# Patient Record
Sex: MALE | Race: BLACK OR AFRICAN AMERICAN | NOT HISPANIC OR LATINO
[De-identification: names, ages, dates, MRNs, and addresses within clinical notes are randomized per-mention and may not be internally consistent; named-entity substitution may affect disease eponyms.]

---

## 2018-03-15 ENCOUNTER — APPOINTMENT (OUTPATIENT)
Dept: PAIN MANAGEMENT | Facility: CLINIC | Age: 58
End: 2018-03-15

## 2019-03-11 ENCOUNTER — APPOINTMENT (OUTPATIENT)
Dept: INTERNAL MEDICINE | Facility: CLINIC | Age: 59
End: 2019-03-11

## 2019-04-01 ENCOUNTER — APPOINTMENT (OUTPATIENT)
Dept: INTERNAL MEDICINE | Facility: CLINIC | Age: 59
End: 2019-04-01
Payer: MEDICARE

## 2019-04-01 ENCOUNTER — NON-APPOINTMENT (OUTPATIENT)
Age: 59
End: 2019-04-01

## 2019-04-01 VITALS
DIASTOLIC BLOOD PRESSURE: 94 MMHG | HEART RATE: 62 BPM | RESPIRATION RATE: 17 BRPM | OXYGEN SATURATION: 99 % | SYSTOLIC BLOOD PRESSURE: 199 MMHG | BODY MASS INDEX: 27.09 KG/M2 | HEIGHT: 72 IN | TEMPERATURE: 98.5 F | WEIGHT: 200 LBS

## 2019-04-01 DIAGNOSIS — R30.9 PAINFUL MICTURITION, UNSPECIFIED: ICD-10-CM

## 2019-04-01 PROCEDURE — 99204 OFFICE O/P NEW MOD 45 MIN: CPT

## 2019-04-01 RX ORDER — ACETAMINOPHEN 500 MG/1
500 TABLET, COATED ORAL
Qty: 60 | Refills: 0 | Status: ACTIVE | COMMUNITY
Start: 2019-04-01 | End: 1900-01-01

## 2019-04-11 LAB
ALBUMIN SERPL ELPH-MCNC: 4.3 G/DL
ALP BLD-CCNC: 57 U/L
ALT SERPL-CCNC: 20 U/L
ANION GAP SERPL CALC-SCNC: 14 MMOL/L
APPEARANCE: CLEAR
AST SERPL-CCNC: 19 U/L
BACTERIA UR CULT: NORMAL
BACTERIA: NEGATIVE
BASOPHILS # BLD AUTO: 0.04 K/UL
BASOPHILS NFR BLD AUTO: 0.7 %
BILIRUB SERPL-MCNC: 0.2 MG/DL
BILIRUBIN URINE: NEGATIVE
BLOOD URINE: NEGATIVE
BUN SERPL-MCNC: 20 MG/DL
C TRACH RRNA SPEC QL NAA+PROBE: NOT DETECTED
CALCIUM SERPL-MCNC: 9.5 MG/DL
CHLORIDE SERPL-SCNC: 106 MMOL/L
CO2 SERPL-SCNC: 26 MMOL/L
COLOR: NORMAL
CREAT SERPL-MCNC: 1.23 MG/DL
EOSINOPHIL # BLD AUTO: 0.11 K/UL
EOSINOPHIL NFR BLD AUTO: 2 %
ESTIMATED AVERAGE GLUCOSE: 128 MG/DL
GLUCOSE QUALITATIVE U: NEGATIVE
GLUCOSE SERPL-MCNC: 117 MG/DL
HAV IGM SER QL: NONREACTIVE
HBA1C MFR BLD HPLC: 6.1 %
HBV CORE IGM SER QL: NONREACTIVE
HBV SURFACE AG SER QL: NONREACTIVE
HCT VFR BLD CALC: 47.5 %
HCV AB SER QL: NONREACTIVE
HCV S/CO RATIO: 0.09 S/CO
HGB BLD-MCNC: 14.4 G/DL
HIV1+2 AB SPEC QL IA.RAPID: NONREACTIVE
HYALINE CASTS: 2 /LPF
IMM GRANULOCYTES NFR BLD AUTO: 0 %
KETONES URINE: NEGATIVE
LEUKOCYTE ESTERASE URINE: ABNORMAL
LYMPHOCYTES # BLD AUTO: 1.26 K/UL
LYMPHOCYTES NFR BLD AUTO: 22.4 %
MAN DIFF?: NORMAL
MCHC RBC-ENTMCNC: 26.4 PG
MCHC RBC-ENTMCNC: 30.3 GM/DL
MCV RBC AUTO: 87.2 FL
MICROSCOPIC-UA: NORMAL
MONOCYTES # BLD AUTO: 0.34 K/UL
MONOCYTES NFR BLD AUTO: 6 %
N GONORRHOEA RRNA SPEC QL NAA+PROBE: NOT DETECTED
NEUTROPHILS # BLD AUTO: 3.87 K/UL
NEUTROPHILS NFR BLD AUTO: 68.9 %
NITRITE URINE: NEGATIVE
PH URINE: 6.5
PLATELET # BLD AUTO: 212 K/UL
POTASSIUM SERPL-SCNC: 4.9 MMOL/L
PROT SERPL-MCNC: 7.2 G/DL
PROTEIN URINE: NORMAL
PSA FREE FLD-MCNC: 20 %
PSA FREE SERPL-MCNC: 1.02 NG/ML
PSA SERPL-MCNC: 5.07 NG/ML
RBC # BLD: 5.45 M/UL
RBC # FLD: 14.6 %
RED BLOOD CELLS URINE: 3 /HPF
SODIUM SERPL-SCNC: 146 MMOL/L
SOURCE AMPLIFICATION: NORMAL
SPECIFIC GRAVITY URINE: 1.02
SQUAMOUS EPITHELIAL CELLS: 9 /HPF
T PALLIDUM AB SER QL IA: NEGATIVE
TSH SERPL-ACNC: 1.69 UIU/ML
UROBILINOGEN URINE: NORMAL
WBC # FLD AUTO: 5.62 K/UL
WHITE BLOOD CELLS URINE: 3 /HPF

## 2019-04-17 ENCOUNTER — APPOINTMENT (OUTPATIENT)
Dept: ORTHOPEDIC SURGERY | Facility: CLINIC | Age: 59
End: 2019-04-17
Payer: MEDICARE

## 2019-04-17 DIAGNOSIS — G89.29 PAIN IN RIGHT KNEE: ICD-10-CM

## 2019-04-17 DIAGNOSIS — M25.561 PAIN IN RIGHT KNEE: ICD-10-CM

## 2019-04-17 PROCEDURE — 99203 OFFICE O/P NEW LOW 30 MIN: CPT

## 2019-04-17 PROCEDURE — 73562 X-RAY EXAM OF KNEE 3: CPT | Mod: RT

## 2019-04-17 RX ORDER — MELOXICAM 15 MG/1
15 TABLET ORAL
Qty: 30 | Refills: 1 | Status: ACTIVE | COMMUNITY
Start: 2019-04-17 | End: 1900-01-01

## 2019-04-18 LAB
BASOPHILS # BLD AUTO: 0.04 K/UL
BASOPHILS NFR BLD AUTO: 0.7 %
CRP SERPL-MCNC: 0.18 MG/DL
EOSINOPHIL # BLD AUTO: 0.15 K/UL
EOSINOPHIL NFR BLD AUTO: 2.5 %
ERYTHROCYTE [SEDIMENTATION RATE] IN BLOOD BY WESTERGREN METHOD: 16 MM/HR
HCT VFR BLD CALC: 47.9 %
HGB BLD-MCNC: 15 G/DL
IMM GRANULOCYTES NFR BLD AUTO: 0.3 %
LYMPHOCYTES # BLD AUTO: 1.55 K/UL
LYMPHOCYTES NFR BLD AUTO: 25.9 %
MAN DIFF?: NORMAL
MCHC RBC-ENTMCNC: 26.5 PG
MCHC RBC-ENTMCNC: 31.3 GM/DL
MCV RBC AUTO: 84.6 FL
MONOCYTES # BLD AUTO: 0.31 K/UL
MONOCYTES NFR BLD AUTO: 5.2 %
NEUTROPHILS # BLD AUTO: 3.92 K/UL
NEUTROPHILS NFR BLD AUTO: 65.4 %
PLATELET # BLD AUTO: 235 K/UL
RBC # BLD: 5.66 M/UL
RBC # FLD: 14.3 %
WBC # FLD AUTO: 5.99 K/UL

## 2019-04-18 NOTE — ASSESSMENT
[FreeTextEntry1] : \par 59-year-old male painful total knee replacement. Sending screening laboratory markers today. Will refer to Dr. kern arthroplasty. All questions answered

## 2019-04-18 NOTE — HISTORY OF PRESENT ILLNESS
[de-identified] : 58yo male, former [employer], presents complaining of right knee pain for one to 2 years. He is status post right knee total knee arthroplasty approximately 3-4 years ago by  at the [facility] in [place]. He is also status post left knee replacement approximately 5 years ago at the same hospital. His pain in his right knee. This is worsened with walking, standing. He feels that his right knee swells intermittently.  He attempts to ice his knee, taking over-the-counter anti-inflammatory medicine. He states he was down in [place] about 1 year ago when someone fell onto his knee.  Denies numbness tingling \par \par The patient's past medical history, past surgical history, medications, allergies, and social history were reviewed by me today with the patient and documented accordingly. In addition, the patient's family history, which is noncontributory to this visit, was also reviewed.\par

## 2019-04-18 NOTE — PHYSICAL EXAM
[de-identified] : General Exam\par \par Well developed, well nourished\par No apparent distress\par Oriented to person, place, and time\par Mood: Normal\par Affect: Normal\par Balance and coordination: Normal\par Gait: Normal\par \par right knee exam\par \par Skin: Clean, dry, intact\par Inspection: No obvious malalignment, no masses, + swelling, + effusion.\par Tenderness: no MJLT. No LJLT. No tenderness over the medial and lateral patella facets. No ttp medial/lateral epicondyle, patella tendon, tibial tubercle, pes anserinus, or proximal fibula.\par ROM: 0 to 100° no pain with deep flexion in both knees\par Stability: Stable to varus, valgus, a/p stress\par Strength: 5/5 Q/H/TA/GS/EHL, no atrophy\par Neuro: In tact to light touch throughout in dp/sp/tib/luis eduardo/saph nerve districutions, DTR's normal\par Pulses: 2+ DP/PT pulses.\par  [de-identified] : \par The following radiographs were ordered and read by me during this patients visit. I reviewed each radiograph in detail with the patient and discussed the findings as highlighted below. \par \par 3 views right knee were obtained today. Well-positioned knee replacement no evidence of loosening the fracture\par

## 2019-04-29 ENCOUNTER — APPOINTMENT (OUTPATIENT)
Dept: ORTHOPEDIC SURGERY | Facility: CLINIC | Age: 59
End: 2019-04-29
Payer: MEDICARE

## 2019-04-29 DIAGNOSIS — Z96.651 PAIN DUE TO INTERNAL ORTHOPEDIC PROSTHETIC DEVICES, IMPLANTS AND GRAFTS, INITIAL ENCOUNTER: ICD-10-CM

## 2019-04-29 DIAGNOSIS — M54.16 RADICULOPATHY, LUMBAR REGION: ICD-10-CM

## 2019-04-29 DIAGNOSIS — T84.84XA PAIN DUE TO INTERNAL ORTHOPEDIC PROSTHETIC DEVICES, IMPLANTS AND GRAFTS, INITIAL ENCOUNTER: ICD-10-CM

## 2019-04-29 DIAGNOSIS — Z96.651 PRESENCE OF RIGHT ARTIFICIAL KNEE JOINT: ICD-10-CM

## 2019-04-29 PROCEDURE — 99214 OFFICE O/P EST MOD 30 MIN: CPT

## 2019-04-29 NOTE — HISTORY OF PRESENT ILLNESS
[de-identified] : This is a 59-year-old gentleman experiencing right leg pain, which is severe in intensity. He underwent a right total knee arthroplasty 3 years ago on the  in [place]. He healed well after the surgery. He started having pain approximately 3 months ago throughout the entire right leg with stiffness in the leg and heaviness. The pain is intermittent. His knee is not giving way. He thought that this was secondary to his knee replacement so he was referred to me. The pain in the leg substantially limits activities of daily living. Walking tolerance is reduced. He is not currently taking medications for this. He complains of stiffness. He also has numbness and tingling in the right leg. He does not use a cane or walker. CRP 1.8 per liter and ESR is 16.

## 2019-04-29 NOTE — DISCUSSION/SUMMARY
[de-identified] : The right total knee arthroplasty appears to be functioning well but his PTT secondary to right lower extremity radiculopathy. Her to followup with physiatry. Over-the-counter NSAIDs are encouraged. Followup is recommended on an annual basis for followup of his total knee arthroplasty.

## 2019-04-29 NOTE — PHYSICAL EXAM
[de-identified] : Patient is well nourished, well-developed, in no acute distress, with appropriate mood and affect. The patient is oriented to time, place, and person. Respirations are even and unlabored. Gait evaluation does not reveal a limp. There is no inguinal adenopathy. Examination of the contralateral knee shows normal range of motion, strength, no tenderness, and intact skin. The affected limb is well-perfused and showed 2+ dp/pt pulses, with a well-healed midline surgical scar, shows a grossly normal motor and sensory examination. Knee motion is painless and the knee moves from 0-125 degrees. The knee is stable within that range-of-motion to AP and ML stress and no instability to varus or valgus stress. The alignment of the knee is neutral. No effusion or crepitus is noted. No tenderness to palpation about the medial or lateral joint line, medial or lateral tibial plateau, medial or lateral femoral condyle, medial or lateral patellar facets, superior or inferior pole of the patella. Muscle strength is normal. Pedal pulses are palpable. Hip examination was negative.\par  [de-identified] : AP and lateral knee x-rays of the right knee were reviewed. Satisfactory position and alignment of the components are present. No signs of loosening are seen.\par

## 2019-05-01 ENCOUNTER — APPOINTMENT (OUTPATIENT)
Dept: INTERNAL MEDICINE | Facility: CLINIC | Age: 59
End: 2019-05-01

## 2019-05-15 ENCOUNTER — APPOINTMENT (OUTPATIENT)
Dept: INTERNAL MEDICINE | Facility: CLINIC | Age: 59
End: 2019-05-15
Payer: MEDICARE

## 2019-05-15 VITALS
BODY MASS INDEX: 27.09 KG/M2 | HEART RATE: 63 BPM | RESPIRATION RATE: 17 BRPM | TEMPERATURE: 97.6 F | SYSTOLIC BLOOD PRESSURE: 164 MMHG | OXYGEN SATURATION: 97 % | HEIGHT: 72 IN | WEIGHT: 200 LBS | DIASTOLIC BLOOD PRESSURE: 91 MMHG

## 2019-05-15 DIAGNOSIS — H57.89 OTHER SPECIFIED DISORDERS OF EYE AND ADNEXA: ICD-10-CM

## 2019-05-15 DIAGNOSIS — R94.31 ABNORMAL ELECTROCARDIOGRAM [ECG] [EKG]: ICD-10-CM

## 2019-05-15 PROCEDURE — 99215 OFFICE O/P EST HI 40 MIN: CPT

## 2019-05-15 RX ORDER — MELOXICAM 7.5 MG/1
7.5 TABLET ORAL
Qty: 30 | Refills: 3 | Status: ACTIVE | COMMUNITY
Start: 2019-05-15 | End: 1900-01-01

## 2019-05-15 NOTE — PHYSICAL EXAM
[No Acute Distress] : no acute distress [Well Nourished] : well nourished [Well Developed] : well developed [Well-Appearing] : well-appearing [Normal Sclera/Conjunctiva] : normal sclera/conjunctiva [PERRL] : pupils equal round and reactive to light [EOMI] : extraocular movements intact [Normal Outer Ear/Nose] : the outer ears and nose were normal in appearance [Normal Oropharynx] : the oropharynx was normal [Supple] : supple [No Lymphadenopathy] : no lymphadenopathy [Thyroid Normal, No Nodules] : the thyroid was normal and there were no nodules present [No Respiratory Distress] : no respiratory distress  [Clear to Auscultation] : lungs were clear to auscultation bilaterally [No Accessory Muscle Use] : no accessory muscle use [Normal Rate] : normal rate  [Regular Rhythm] : with a regular rhythm [Normal S1, S2] : normal S1 and S2 [No Murmur] : no murmur heard [No Varicosities] : no varicosities [Pedal Pulses Present] : the pedal pulses are present [No Edema] : there was no peripheral edema [No Extremity Clubbing/Cyanosis] : no extremity clubbing/cyanosis [Soft] : abdomen soft [Non Tender] : non-tender [Non-distended] : non-distended [No Masses] : no abdominal mass palpated [No HSM] : no HSM [Normal Bowel Sounds] : normal bowel sounds [Normal Posterior Cervical Nodes] : no posterior cervical lymphadenopathy [Normal Anterior Cervical Nodes] : no anterior cervical lymphadenopathy [No CVA Tenderness] : no CVA  tenderness [No Joint Swelling] : no joint swelling [No Spinal Tenderness] : no spinal tenderness [No Rash] : no rash [Grossly Normal Strength/Tone] : grossly normal strength/tone [Normal Gait] : normal gait [Coordination Grossly Intact] : coordination grossly intact [No Focal Deficits] : no focal deficits [Deep Tendon Reflexes (DTR)] : deep tendon reflexes were 2+ and symmetric [Normal Affect] : the affect was normal [Normal Insight/Judgement] : insight and judgment were intact [] : no respiratory distress [Auscultation Breath Sounds / Voice Sounds] : lungs were clear to auscultation bilaterally [Heart Rate And Rhythm] : heart rate was normal and rhythm regular [Heart Sounds] : normal S1 and S2 [Heart Sounds Gallop] : no gallops [Murmurs] : no murmurs [Heart Sounds Pericardial Friction Rub] : no pericardial rub

## 2019-05-16 LAB
PSA FREE FLD-MCNC: 20 %
PSA FREE SERPL-MCNC: 1.03 NG/ML
PSA SERPL-MCNC: 5.08 NG/ML

## 2019-05-16 NOTE — HISTORY OF PRESENT ILLNESS
[de-identified] : 58 yo male with history of HTN, prediabetes, elevated PSA,  DJD/knee pain/OA s/p knee repalcement presents for f/u\par \par For prior c/o chronic b/l knee pain. He was seen by orthopedics where x-rays were done which showed alignment status post knee replacement. He will be referred back to orthopedics for further followup. In the meantime we will use NSAIDs to help with his symptoms\par \par Last visit patient with hypertension for which lisinopril was prescribed however the patient has been running out of his medication. We discussed adherence and addition of amlodipine to help control his blood pressure. Side effects reviewed and the patient will  his prescription today and return to the office for followup blood pressure check in one week. He had an abnormal EKG with [facility] and also needs to schedule followup with cardiology. A referral was given\par abnormal EKG- [facility]\par \par elevated PSA- His recent labs revealed an elevated PSA for which she was seen by q. rheumatology with recommendations to repeat his PSA one month later. He has not yet scheduled an appointment and would like to repeat his PSA in the office today. If it remains elevated I have asked the patient to go to urology to discuss need for a biopsy. She does also complaining of erectile dysfunction which he should further discuss with his urologist after cardiology assessment was completed\par \par He appears to have chronic dry eyes and irritation with bilateral conjunctival erythema and I've referred the patient to an eye doctor\par eye doctor\par \par ROS: b/l knee pain; painful urination, no lesion on penis\par Significant PMH: HTN\par Surgical Hx: cervical spine fusion, b/l knee replacement\par Allergies: NKDA\par Meds: lisinopril 20\par \par Brief Social History: work- labor\par Living situation:  Lives with girlfriend; has 2 sons\par Tobacco Use: Never smoked.\par EtOH/Drug use: 1-2 cans of beer; cut back discussed\par \par Immunization:\par Flu: declined\par \par Screening:\par Colon CA Screening: last done 2011; referral given\par

## 2019-05-16 NOTE — ASSESSMENT
[FreeTextEntry1] : 60 yo male with history of HTN, prediabetes, elevated PSA,  DJD/knee pain/OA s/p knee repalcement presents for f/u\par \par 1. HTN\par hypertension \par - add amlodipine to  lisinopril \par -Adherence with stress\par - BP side effects reviewed. \par -Goal BP <130/80; Patient should continue her blood pressure monitoring in the outpatient setting with a blood pressure monitor\par -Maintain ideal BMI, low sodium diet\par -Return to the office in 3-4 months for followup or sooner if BP running high\par \par 2. chronic b/l knee pain\par - seen by orthopedics will f/u\par - NSAIDs to help with his symptoms\par \par 3. abnormal EKG- [facility]\par -refer to cards\par \par 4. elevated PSA with c/o ED\par -refer back to urology\par -repeat 1 months PSA\par \par 5. chronic dry eyes \par - eye doctor\par \par Screening:\par Colon CA Screening: last done 2011; referral given\par \par RTC 3-4 mos

## 2019-06-01 ENCOUNTER — APPOINTMENT (OUTPATIENT)
Dept: INTERNAL MEDICINE | Facility: CLINIC | Age: 59
End: 2019-06-01
Payer: MEDICARE

## 2019-06-01 VITALS
HEART RATE: 68 BPM | HEIGHT: 72 IN | SYSTOLIC BLOOD PRESSURE: 127 MMHG | RESPIRATION RATE: 17 BRPM | WEIGHT: 200 LBS | DIASTOLIC BLOOD PRESSURE: 78 MMHG | BODY MASS INDEX: 27.09 KG/M2 | OXYGEN SATURATION: 98 %

## 2019-06-01 DIAGNOSIS — M25.562 PAIN IN RIGHT KNEE: ICD-10-CM

## 2019-06-01 DIAGNOSIS — M25.561 PAIN IN RIGHT KNEE: ICD-10-CM

## 2019-06-01 DIAGNOSIS — I10 ESSENTIAL (PRIMARY) HYPERTENSION: ICD-10-CM

## 2019-06-01 DIAGNOSIS — R97.20 ELEVATED PROSTATE, SPECIFIC ANTIGEN [PSA]: ICD-10-CM

## 2019-06-01 DIAGNOSIS — G89.29 PAIN IN RIGHT KNEE: ICD-10-CM

## 2019-06-01 PROCEDURE — 99214 OFFICE O/P EST MOD 30 MIN: CPT

## 2019-06-01 NOTE — PHYSICAL EXAM
[No Acute Distress] : no acute distress [Well Nourished] : well nourished [Well Developed] : well developed [Well-Appearing] : well-appearing [Normal Sclera/Conjunctiva] : normal sclera/conjunctiva [PERRL] : pupils equal round and reactive to light [EOMI] : extraocular movements intact [Normal Oropharynx] : the oropharynx was normal [Normal Outer Ear/Nose] : the outer ears and nose were normal in appearance [Supple] : supple [No Lymphadenopathy] : no lymphadenopathy [Thyroid Normal, No Nodules] : the thyroid was normal and there were no nodules present [No Respiratory Distress] : no respiratory distress  [Clear to Auscultation] : lungs were clear to auscultation bilaterally [No Accessory Muscle Use] : no accessory muscle use [Regular Rhythm] : with a regular rhythm [Normal Rate] : normal rate  [Normal S1, S2] : normal S1 and S2 [No Murmur] : no murmur heard [No Varicosities] : no varicosities [Pedal Pulses Present] : the pedal pulses are present [No Edema] : there was no peripheral edema [No Extremity Clubbing/Cyanosis] : no extremity clubbing/cyanosis [Soft] : abdomen soft [Non-distended] : non-distended [Non Tender] : non-tender [No Masses] : no abdominal mass palpated [No HSM] : no HSM [Normal Bowel Sounds] : normal bowel sounds [Normal Posterior Cervical Nodes] : no posterior cervical lymphadenopathy [Normal Anterior Cervical Nodes] : no anterior cervical lymphadenopathy [No CVA Tenderness] : no CVA  tenderness [No Spinal Tenderness] : no spinal tenderness [No Joint Swelling] : no joint swelling [Grossly Normal Strength/Tone] : grossly normal strength/tone [No Rash] : no rash [Normal Gait] : normal gait [Coordination Grossly Intact] : coordination grossly intact [Deep Tendon Reflexes (DTR)] : deep tendon reflexes were 2+ and symmetric [No Focal Deficits] : no focal deficits [Normal Affect] : the affect was normal [Normal Insight/Judgement] : insight and judgment were intact

## 2019-06-01 NOTE — HISTORY OF PRESENT ILLNESS
[de-identified] : 60 yo male with history of HTN, prediabetes, elevated PSA,  DJD/knee pain/OA s/p knee replacement presents for f/u\par \par For prior c/o chronic b/l knee pain. He was seen by orthopedics where x-rays were done which showed alignment status post knee replacement. He will be referred back to orthopedics for further followup. In the meantime we will use NSAIDs to help with his symptoms\par \par Last visit patient with hypertension for which lisinopril and amlodipine was given; good adherence and f/u BP at goal.\par He had an abnormal EKG with [facility] and also needs to schedule followup with cardiology. A referral was given\par abnormal EKG- [facility]\par \par elevated PSA- His recent labs revealed an elevated PSA for which she was seen by urology; repeat also > 5, pt had a prostate biopsy done yesterday, results pending and will f/u urology 2 weeks\par \par He appears to have chronic dry eyes and irritation with bilateral conjunctival erythema and I've referred the patient to an eye doctor\par eye doctor\par \par ROS: b/l knee pain; painful urination, no lesion on penis\par Significant PMH: HTN\par Surgical Hx: cervical spine fusion, b/l knee replacement\par Allergies: NKDA\par Meds: lisinopril 20\par \par Brief Social History: work- labor\par Living situation:  Lives with girlfriend; has 2 sons\par Tobacco Use: Never smoked.\par EtOH/Drug use: 1-2 cans of beer; cut back discussed\par \par Screening:\par Colon CA Screening: last done 2011; referral given\par

## 2019-06-01 NOTE — ASSESSMENT
[FreeTextEntry1] : 58 yo male with history of HTN, prediabetes, elevated PSA,  DJD/knee pain/OA s/p knee repalcement presents for f/u\par \par 1. HTN\par hypertension \par - better on amlodipine to  lisinopril \par -Adherence with stress\par -Goal BP <130/80; Patient should continue her blood pressure monitoring in the outpatient setting with a blood pressure monitor\par -Maintain ideal BMI, low sodium diet\par -Return to the office in 3-4 months for followup or sooner if BP running high\par \par 2. chronic b/l knee pain\par - seen by orthopedics will f/u\par - NSAIDs to help with his symptoms\par \par 3. abnormal EKG- [facility]\par -refer to cards\par \par 4. elevated PSA with c/o ED\par -s/p biopsy\par -f/u urology\par \par 5. chronic dry eyes \par - eye doctor\par \par Screening:\par Colon CA Screening: last done 2011; referral given\par \par RTC 3-4 mos

## 2019-06-20 ENCOUNTER — APPOINTMENT (OUTPATIENT)
Dept: CARDIOLOGY | Facility: CLINIC | Age: 59
End: 2019-06-20

## 2019-07-11 RX ORDER — MELOXICAM 15 MG/1
15 TABLET ORAL
Qty: 30 | Refills: 1 | Status: ACTIVE | COMMUNITY
Start: 2019-07-11 | End: 1900-01-01

## 2019-09-10 ENCOUNTER — APPOINTMENT (OUTPATIENT)
Dept: INTERNAL MEDICINE | Facility: CLINIC | Age: 59
End: 2019-09-10

## 2019-09-22 ENCOUNTER — MOBILE ON CALL (OUTPATIENT)
Age: 59
End: 2019-09-22

## 2019-09-22 RX ORDER — MELOXICAM 15 MG/1
15 TABLET ORAL
Qty: 30 | Refills: 1 | Status: ACTIVE | COMMUNITY
Start: 2019-09-22 | End: 1900-01-01

## 2019-09-23 ENCOUNTER — RX RENEWAL (OUTPATIENT)
Age: 59
End: 2019-09-23

## 2019-09-23 RX ORDER — AMLODIPINE BESYLATE 5 MG/1
5 TABLET ORAL DAILY
Qty: 30 | Refills: 3 | Status: ACTIVE | COMMUNITY
Start: 2019-05-15 | End: 1900-01-01

## 2019-10-21 ENCOUNTER — APPOINTMENT (OUTPATIENT)
Dept: INTERNAL MEDICINE | Facility: CLINIC | Age: 59
End: 2019-10-21